# Patient Record
Sex: MALE | Race: WHITE | NOT HISPANIC OR LATINO | Employment: FULL TIME | URBAN - METROPOLITAN AREA
[De-identification: names, ages, dates, MRNs, and addresses within clinical notes are randomized per-mention and may not be internally consistent; named-entity substitution may affect disease eponyms.]

---

## 2023-10-02 ENCOUNTER — OFFICE VISIT (OUTPATIENT)
Dept: UROLOGY | Facility: HOSPITAL | Age: 33
End: 2023-10-02
Payer: COMMERCIAL

## 2023-10-02 VITALS
DIASTOLIC BLOOD PRESSURE: 81 MMHG | SYSTOLIC BLOOD PRESSURE: 147 MMHG | HEART RATE: 60 BPM | HEIGHT: 70 IN | WEIGHT: 177.8 LBS | BODY MASS INDEX: 25.45 KG/M2

## 2023-10-02 DIAGNOSIS — Z30.09 VASECTOMY EVALUATION: Primary | ICD-10-CM

## 2023-10-02 PROCEDURE — 99213 OFFICE O/P EST LOW 20 MIN: CPT | Performed by: UROLOGY

## 2023-10-02 PROCEDURE — 1036F TOBACCO NON-USER: CPT | Performed by: UROLOGY

## 2023-10-02 PROCEDURE — 99203 OFFICE O/P NEW LOW 30 MIN: CPT | Performed by: UROLOGY

## 2023-10-02 RX ORDER — GABAPENTIN 300 MG/1
600 CAPSULE ORAL ONCE
Status: CANCELLED | OUTPATIENT
Start: 2023-10-02 | End: 2023-10-02

## 2023-10-02 RX ORDER — CELECOXIB 50 MG/1
400 CAPSULE ORAL ONCE
Status: CANCELLED | OUTPATIENT
Start: 2023-10-02 | End: 2023-10-02

## 2023-10-02 RX ORDER — ACETAMINOPHEN 325 MG/1
975 TABLET ORAL ONCE
Status: CANCELLED | OUTPATIENT
Start: 2023-10-02 | End: 2023-10-02

## 2023-10-02 NOTE — PROGRESS NOTES
Subjective   Jori Hammonds is a 33 y.o. male who was self-referred for evaluation regarding elective sterilization. he is aware of alternatives to vasectomy.     Previous Children: 4  children ages 13, 11, 3 and 8 months   Previous scrotal surgeries? no  Any current scrotal pain? no  Taking blood thinners? No  Allergies? No   Works from home  Golf outing in Nov at [employer]    No past medical history on file.    No past surgical history on file.    No current outpatient medications on file.     No current facility-administered medications for this visit.           No Known Allergies     Exam  Testicles descended bilaterally, vas palpable  Penis without lesions    #Vasectomy  Desires elective vasectomy.  In summary, the patient has a history of fecundity anxiety and would like to proceed with a vasectomy.  I had a detailed discussion with him regarding the risks, benefits and alternatives to the procedure and he would like to proceed at this time.      I discussed the following with the patient:    · Vasectomy is intended to be a permanent form of contraception.  · Vasectomy does not produce immediate sterility.  · Following vasectomy, another form of contraception is required until vas occlusion is confirmed by post- vasectomy semen analysis (PVSA).  · Even after vas occlusion is confirmed, vasectomy is not 100% reliable in preventing pregnancy.  · The risk of pregnancy after vasectomy is approximately 1 in 2,000 for men who have post-vasectomy azoospermia or PVSA showing rare non-motile sperm (RNMS).  · Repeat vasectomy is necessary in <1% of vasectomies, provided that a technique for vas occlusion known to have a low occlusive failure rate has been used.  · Patients should refrain from ejaculation for approximately one week after vasectomy.  · Options for fertility after vasectomy include vasectomy reversal and sperm retrieval with in vitro fertilization. These options are not always successful, and they  may be expensive.    Sperm banking was also offered to the patient.    He understands that he must have protected intercourse after the vasectomy until he is able to provide a negative semen sample.  He may stop using other methods of contraception when examination of one well-mixed, uncentrifuged, fresh post-vasectomy semen specimen shows azoospermia or only rare non-motile sperm (RNMS or <100,000 non-motile sperm/mL).    Will proceed with vasectomy with: Local  Sperm Banking information given per patient request : no    Scribe Attestation  By signing my name below, I, Marj Joaquin , Scribe   attest that this documentation has been prepared under the direction and in the presence of Swapnil Shearer MD.

## 2023-12-11 ENCOUNTER — APPOINTMENT (OUTPATIENT)
Dept: UROLOGY | Facility: HOSPITAL | Age: 33
End: 2023-12-11
Payer: COMMERCIAL

## 2023-12-18 ENCOUNTER — PROCEDURE VISIT (OUTPATIENT)
Dept: UROLOGY | Facility: HOSPITAL | Age: 33
End: 2023-12-18
Payer: COMMERCIAL

## 2023-12-18 VITALS — SYSTOLIC BLOOD PRESSURE: 137 MMHG | HEART RATE: 68 BPM | DIASTOLIC BLOOD PRESSURE: 71 MMHG

## 2023-12-18 DIAGNOSIS — Z30.2 ENCOUNTER FOR STERILIZATION: Primary | ICD-10-CM

## 2023-12-18 PROCEDURE — 55250 REMOVAL OF SPERM DUCT(S): CPT | Performed by: UROLOGY

## 2023-12-18 NOTE — PROGRESS NOTES
Patient ID: Jori Hammonds is a 33 y.o. male.    Procedures  Pre-Procedure Diagnosis: Fecundity anxiety   Post-Procedure Diagnosis: Same   Procedure Performed: Bilateral vasectomy     Surgeon: Swapnil Shearer MD   Assistant: Mary Caraballo RN  EBL: 5cc   Complications: None   Specimens: None   Anesthesia: Local     Procedure in Detail:     After obtaining informed consent, the patient was prepped and draped in the standard sterile fashion in the lower abdominal and genitalia region.       Starting on the patient's right hemiscrotum, the vas deferens was identified and isolated. Local analgesia was performed using a 50:50 mixture of 1% lidocaine and 1% bupivicaine to anesthetize the skin and the perivasal tissue.  A 1 cm longitudinal incision was made and the vas deferens was isolated with a vas clamp. The vasal tissue was incised and the vas deferens was brought out through the wound The middle segment of vas deferens was then excised and passed off of the field. The musocal was cauterized with needle tip bovie electrocautery. After obtaining adequate hemostasis, the distal vasal end was put back into the scrotum .  The proximal vasal end was then dropped back into the scrotum and the scrotum was closed with 3-0 chromic in a simple interrupted fashion.  An identical procedure was performed on the patients left side.      The wounds were then cleaned and draped with sterile telfa gauze.  Scrotal support and fluffs were applied. He tolerated the procedure well and was sent home in stable condition. I gave him strict instructions that he must have a negative semen sample before engaging in unprotected intercourse or he is at risk for achieving a pregnancy. He will follow up in 4 months.     Scribe Attestation  By signing my name below, I, Tia Gomez   attest that this documentation has been prepared under the direction and in the presence of Swapnil Shearer MD.

## 2024-04-01 ENCOUNTER — ANCILLARY PROCEDURE (OUTPATIENT)
Dept: ENDOCRINOLOGY | Facility: CLINIC | Age: 34
End: 2024-04-01
Payer: COMMERCIAL

## 2024-04-01 DIAGNOSIS — Z30.2 ENCOUNTER FOR STERILIZATION: ICD-10-CM

## 2024-04-01 LAB
ABSTINENCE (DAYS): 5 DAYS (ref 2–7)
AGGLUTINATION (SEMEN): NO
ANALYZED TIME:: ABNORMAL
ANDROLOGY LAB ID#: ABNORMAL
CLUMPS (SEMEN): NO
COLLECTED COMPLETELY: YES
COLLECTION LOCATION:: ABNORMAL
COLLECTION METHOD:: ABNORMAL
CONCENTRATION CN (POST-WASH): 0 MILL/ML
CONCENTRATION(SEMEN): 0 MILL/ML (ref 15–?)
DEBRIS (SEMEN): YES
NON PROG. MOTILITY (SEMEN): 0 %
NON PROG. MOTILITY CN (POST-WASH): 0 %
PROG. MOTILITY (SEMEN): 0 % (ref 32–?)
PROG. MOTILITY CN (POST-WASH): 0 %
RECEIVED TIME:: ABNORMAL
SEMEN APPEARANCE: NORMAL
SEMEN LIQUEFACTION: NORMAL
SEMEN VISCOSITY: NORMAL
TOTAL MOTILITY (SEMEN): 0 % (ref 40–?)
TOTAL MOTILITY CN (POST-WASH): 0 %
TOTAL NO OF MOTILE (SEMEN): 0 MILL
TOTAL NO OF MOTILE CN (POST-WASH): 0 MILL
TOTAL NO OF SPERM (SEMEN): 0 MILL (ref 39–?)
TOTAL NO OF SPERM CN (POST-WASH): 0 MILL
VOLUME (SEMEN): 11.7 ML (ref 1.5–?)
VOLUME CN (POST-WASH): 0.2 ML

## 2024-04-01 PROCEDURE — 89321 SEMEN ANAL SPERM DETECTION: CPT | Performed by: OBSTETRICS & GYNECOLOGY

## 2024-05-06 ENCOUNTER — OFFICE VISIT (OUTPATIENT)
Dept: PRIMARY CARE | Facility: CLINIC | Age: 34
End: 2024-05-06
Payer: COMMERCIAL

## 2024-05-06 VITALS
HEIGHT: 70 IN | RESPIRATION RATE: 18 BRPM | HEART RATE: 58 BPM | WEIGHT: 194 LBS | BODY MASS INDEX: 27.77 KG/M2 | SYSTOLIC BLOOD PRESSURE: 138 MMHG | DIASTOLIC BLOOD PRESSURE: 70 MMHG | OXYGEN SATURATION: 98 %

## 2024-05-06 DIAGNOSIS — F41.9 ANXIETY: ICD-10-CM

## 2024-05-06 DIAGNOSIS — Z00.00 HEALTHCARE MAINTENANCE: Primary | ICD-10-CM

## 2024-05-06 DIAGNOSIS — B35.1 ONYCHOMYCOSIS OF TOENAIL: ICD-10-CM

## 2024-05-06 PROBLEM — M53.3 SI (SACROILIAC) PAIN: Status: RESOLVED | Noted: 2017-01-18 | Resolved: 2024-05-06

## 2024-05-06 PROBLEM — M54.40 LOW BACK PAIN WITH SCIATICA: Status: RESOLVED | Noted: 2017-01-17 | Resolved: 2024-05-06

## 2024-05-06 PROBLEM — G54.9: Status: RESOLVED | Noted: 2017-01-18 | Resolved: 2024-05-06

## 2024-05-06 PROCEDURE — 99385 PREV VISIT NEW AGE 18-39: CPT

## 2024-05-06 PROCEDURE — 1036F TOBACCO NON-USER: CPT

## 2024-05-06 RX ORDER — CICLOPIROX 80 MG/ML
SOLUTION TOPICAL
Qty: 6.6 ML | Refills: 2 | Status: SHIPPED | OUTPATIENT
Start: 2024-05-06

## 2024-05-06 RX ORDER — ARIPIPRAZOLE 5 MG/1
5 TABLET ORAL DAILY
COMMUNITY
Start: 2024-04-30

## 2024-05-06 ASSESSMENT — ENCOUNTER SYMPTOMS
ANAL BLEEDING: 0
STRIDOR: 0
COLOR CHANGE: 0
SORE THROAT: 0
BLOOD IN STOOL: 0
BRUISES/BLEEDS EASILY: 0
HEADACHES: 0
NAUSEA: 0
CHEST TIGHTNESS: 0
SINUS PRESSURE: 0
RHINORRHEA: 0
WEAKNESS: 0
NECK STIFFNESS: 0
DEPRESSION: 0
PSYCHIATRIC NEGATIVE: 1
APPETITE CHANGE: 0
CHILLS: 0
CONSTITUTIONAL NEGATIVE: 1
SLEEP DISTURBANCE: 0
ACTIVITY CHANGE: 0
CARDIOVASCULAR NEGATIVE: 1
NECK PAIN: 0
EYE DISCHARGE: 0
VOICE CHANGE: 0
PHOTOPHOBIA: 0
GASTROINTESTINAL NEGATIVE: 1
CONFUSION: 0
OCCASIONAL FEELINGS OF UNSTEADINESS: 0
DIARRHEA: 0
HYPERACTIVE: 0
BACK PAIN: 0
LIGHT-HEADEDNESS: 0
EYES NEGATIVE: 1
NERVOUS/ANXIOUS: 0
DIAPHORESIS: 0
JOINT SWELLING: 0
DYSURIA: 0
PALPITATIONS: 0
APNEA: 0
LOSS OF SENSATION IN FEET: 0
DIZZINESS: 0
POLYDIPSIA: 0
FREQUENCY: 0
SPEECH DIFFICULTY: 0
MYALGIAS: 0
SEIZURES: 0
WHEEZING: 0
TROUBLE SWALLOWING: 0
HEMATURIA: 0
DYSPHORIC MOOD: 0
ENDOCRINE NEGATIVE: 1
DIFFICULTY URINATING: 0
RECTAL PAIN: 0
UNEXPECTED WEIGHT CHANGE: 0
TREMORS: 0
FLANK PAIN: 0
NUMBNESS: 0
MUSCULOSKELETAL NEGATIVE: 1
AGITATION: 0
NEUROLOGICAL NEGATIVE: 1
ABDOMINAL DISTENTION: 0
FEVER: 0
FATIGUE: 0
POLYPHAGIA: 0
ABDOMINAL PAIN: 0
COUGH: 0
SHORTNESS OF BREATH: 0
RESPIRATORY NEGATIVE: 1
HEMATOLOGIC/LYMPHATIC NEGATIVE: 1
CONSTIPATION: 0

## 2024-05-06 ASSESSMENT — PATIENT HEALTH QUESTIONNAIRE - PHQ9
SUM OF ALL RESPONSES TO PHQ9 QUESTIONS 1 AND 2: 0
2. FEELING DOWN, DEPRESSED OR HOPELESS: NOT AT ALL
1. LITTLE INTEREST OR PLEASURE IN DOING THINGS: NOT AT ALL

## 2024-05-06 NOTE — PROGRESS NOTES
"Primary Care Provider: Meena Pham, APRN-CNP    Subjective   Jori Hammonds \"Keith\" is a 33 y.o. male who presents for New Patient Visit (Establish primary care and get a good understanding of health markers and where he's at.).    NPV/ est/ CPE    Lives with wife and 4 children  PMH: Anxiety, following with a therapist, on Abilify, doing well on this   Monthly self testicular checks  Had a mastectomy in 2023  Exercises regularly, cycling, weightlifting, running, currently training for a half marathon  Well-balanced diet  Wears contacts- eye exam due  Dental exams- due  Colonoscopy due at age 46 unless otherwise indicated  Can consider CAD screening and age 41 with CT cardiac score  Immunizations up-to-date    Has concerns of:  Onychomycosis- left foot  Will get a heat rash sometimes when hot- no itching, no pain    No chest pain, no shortness of breath, no dizziness  Bowel movements regular, no trouble urinating, energy level is good         Review of Systems   Constitutional: Negative.  Negative for activity change, appetite change, chills, diaphoresis, fatigue, fever and unexpected weight change.   HENT: Negative.  Negative for congestion, dental problem, ear discharge, ear pain, hearing loss, mouth sores, nosebleeds, postnasal drip, rhinorrhea, sinus pressure, sneezing, sore throat, tinnitus, trouble swallowing and voice change.    Eyes: Negative.  Negative for photophobia, discharge and visual disturbance.   Respiratory: Negative.  Negative for apnea, cough, chest tightness, shortness of breath, wheezing and stridor.    Cardiovascular: Negative.  Negative for chest pain, palpitations and leg swelling.   Gastrointestinal: Negative.  Negative for abdominal distention, abdominal pain, anal bleeding, blood in stool, constipation, diarrhea, nausea and rectal pain.   Endocrine: Negative.  Negative for cold intolerance, heat intolerance, polydipsia, polyphagia and polyuria.   Genitourinary: Negative.  " "Negative for decreased urine volume, difficulty urinating, dysuria, flank pain, frequency, hematuria and urgency.   Musculoskeletal: Negative.  Negative for back pain, gait problem, joint swelling, myalgias, neck pain and neck stiffness.   Skin: Negative.  Negative for color change and rash.   Neurological: Negative.  Negative for dizziness, tremors, seizures, syncope, speech difficulty, weakness, light-headedness, numbness and headaches.   Hematological: Negative.  Does not bruise/bleed easily.   Psychiatric/Behavioral: Negative.  Negative for agitation, confusion, dysphoric mood, sleep disturbance and suicidal ideas. The patient is not nervous/anxious and is not hyperactive.    All other systems reviewed and are negative.        Objective   /70   Pulse 58   Resp 18   Ht 1.778 m (5' 10\")   Wt 88 kg (194 lb)   SpO2 98%   BMI 27.84 kg/m²     Physical Exam  Vitals reviewed.   Constitutional:       General: He is not in acute distress.     Appearance: Normal appearance. He is normal weight. He is not ill-appearing, toxic-appearing or diaphoretic.   HENT:      Head: Normocephalic and atraumatic.      Right Ear: Tympanic membrane, ear canal and external ear normal. There is no impacted cerumen.      Left Ear: Tympanic membrane, ear canal and external ear normal. There is no impacted cerumen.      Nose: Nose normal.   Eyes:      Conjunctiva/sclera: Conjunctivae normal.   Cardiovascular:      Rate and Rhythm: Normal rate and regular rhythm.      Pulses: Normal pulses.      Heart sounds: Normal heart sounds. No murmur heard.     No friction rub. No gallop.   Pulmonary:      Effort: Pulmonary effort is normal. No respiratory distress.      Breath sounds: Normal breath sounds.   Abdominal:      General: Abdomen is flat. Bowel sounds are normal.      Palpations: Abdomen is soft.   Musculoskeletal:         General: Normal range of motion.      Cervical back: Normal range of motion and neck supple.   Lymphadenopathy: "      Cervical: No cervical adenopathy.   Skin:     General: Skin is warm and dry.      Capillary Refill: Capillary refill takes less than 2 seconds.   Neurological:      General: No focal deficit present.      Mental Status: He is alert and oriented to person, place, and time. Mental status is at baseline.   Psychiatric:         Mood and Affect: Mood normal.         Behavior: Behavior normal.         Thought Content: Thought content normal.         Judgment: Judgment normal.         Assessment/Plan   Problem List Items Addressed This Visit    New patient visit/establish care/CPE    States he will be sending me some paperwork to complete for him for life insurance     Healthcare maintenance - Primary    Will get a heat rash sometimes when hot- no itching, no pain  Continue with well balanced diet and regular physical activity daily  Relevant Orders    Lipid Panel    CBC and Auto Differential    TSH with reflex to Free T4 if abnormal    Hemoglobin A1C    Comprehensive metabolic panel    Vitamin D 25-Hydroxy,Total (for eval of Vitamin D levels)    Urinalysis with Reflex Microscopic  Wears contacts- eye exam due  Dental exams- due  Colonoscopy due at age 46 unless otherwise indicated  Can consider CAD screening and age 41 with CT cardiac score    Anxiety    Stable, following with therapist- on Abilify   Relevant Orders    Lipid Panel    CBC and Auto Differential    TSH with reflex to Free T4 if abnormal    Hemoglobin A1C    Comprehensive metabolic panel    Vitamin D 25-Hydroxy,Total (for eval of Vitamin D levels)    Urinalysis with Reflex Microscopic    Onychomycosis of toenail    Left foot  Relevant Medications    Start with ciclopirox (Penlac) 8 % solution  Consider Podiatry referral is not improving after using the medication for a few months     Follow-up in 1 year with annual wellness exam and as needed

## 2025-08-15 ENCOUNTER — APPOINTMENT (OUTPATIENT)
Dept: PODIATRY | Facility: CLINIC | Age: 35
End: 2025-08-15
Payer: COMMERCIAL

## 2025-10-03 ENCOUNTER — APPOINTMENT (OUTPATIENT)
Dept: PODIATRY | Facility: CLINIC | Age: 35
End: 2025-10-03
Payer: COMMERCIAL